# Patient Record
Sex: FEMALE | Race: WHITE | NOT HISPANIC OR LATINO | Employment: FULL TIME | URBAN - METROPOLITAN AREA
[De-identification: names, ages, dates, MRNs, and addresses within clinical notes are randomized per-mention and may not be internally consistent; named-entity substitution may affect disease eponyms.]

---

## 2017-07-18 ENCOUNTER — HOSPITAL ENCOUNTER (EMERGENCY)
Facility: MEDICAL CENTER | Age: 62
End: 2017-07-18
Attending: EMERGENCY MEDICINE
Payer: COMMERCIAL

## 2017-07-18 VITALS
DIASTOLIC BLOOD PRESSURE: 80 MMHG | HEART RATE: 74 BPM | OXYGEN SATURATION: 99 % | SYSTOLIC BLOOD PRESSURE: 135 MMHG | WEIGHT: 227.51 LBS | BODY MASS INDEX: 41.87 KG/M2 | RESPIRATION RATE: 16 BRPM | HEIGHT: 62 IN | TEMPERATURE: 99 F

## 2017-07-18 DIAGNOSIS — K64.9 HEMORRHOIDS, UNSPECIFIED HEMORRHOID TYPE: ICD-10-CM

## 2017-07-18 PROCEDURE — 99283 EMERGENCY DEPT VISIT LOW MDM: CPT

## 2017-07-18 ASSESSMENT — PAIN SCALES - GENERAL: PAINLEVEL_OUTOF10: 0

## 2017-07-18 NOTE — ED AVS SNAPSHOT
7/18/2017    Radha Oneill  [address] [place] [address]    Dear Radha:    [facility] wants to ensure your discharge home is safe and you or your loved ones have had all of your questions answered regarding your care after you leave the hospital.    Below is a list of resources and contact information should you have any questions regarding your hospital stay, follow-up instructions, or active medical symptoms.    Questions or Concerns Regarding… Contact   Medical Questions Related to Your Discharge  (7 days a week, 8am-5pm) Contact a Nurse Care Coordinator   [phone]   Medical Questions Not Related to Your Discharge  (24 hours a day / 7 days a week)  Contact the Nurse Health Line   [phone]    Medications or Discharge Instructions Refer to your discharge packet   or contact your [facility] Primary Care Provider   [phone]   Follow-up Appointment(s) Schedule your appointment via [employer]   or contact Scheduling [phone]   Billing Review your statement via [employer]  or contact Billing [phone]   Medical Records Review your records via N[employer]   or contact Medical Records [phone]     You may receive a telephone call within two days of discharge. This call is to make certain you understand your discharge instructions and have the opportunity to have any questions answered. You can also easily access your medical information, test results and upcoming appointments via the Nativoo free online health management tool. You can learn more and sign up at [employer]/Nativoo. For assistance setting up your Nativoo account, please call [phone].    Once again, we want to ensure your discharge home is safe and that you have a clear understanding of any next steps in your care. If you have any questions or concerns, please do not hesitate to contact us, we are here for you. Thank you for choosing [facility] for your healthcare needs.    Sincerely,    Your [facility] Healthcare Team

## 2017-07-18 NOTE — ED PROVIDER NOTES
"ED Provider Note    CHIEF COMPLAINT  Chief Complaint   Patient presents with   • Rectal Bleeding       HPI  Radha Oneill is a 61 y.o. female who presents with bright red blood per rectum. Has a history of hemorrhoids. Has had bleeding hemorrhoids in the past. She had diarrhea for 2 days last week. Bloody watery stool. After that got a little constipated. Over the weekend she started having bright red blood per rectum. She described this as initially being some streaks on tissue paper. She however had blood when she was sitting on a chair. Then she went to the restroom and also saw some clots in the toilet bowl. He has a history of chronic anemia related to her gastric bypass and chronic iron deficiency. She had a blood draw on Friday and is scheduled to see her doctor this week regarding this. She has not had any abdominal pain, nausea or vomiting. Denies dizziness or lightheadedness. No weakness. No fevers.    REVIEW OF SYSTEMS  Pertinent negative: As per HPI    PAST MEDICAL HISTORY  Past Medical History   Diagnosis Date   • Anemia    • GERD (gastroesophageal reflux disease)    • Hypertension    • Obesity    • Herpes        SOCIAL HISTORY  Social History   Substance Use Topics   • Smoking status: Former Smoker     Types: Cigarettes     Quit date: 11/29/2013   • Smokeless tobacco: Never Used   • Alcohol Use: No       SURGICAL HISTORY  Past Surgical History   Procedure Laterality Date   • Abdominal exploration     • Cholecystectomy     • Appendectomy     • Abdominal hysterectomy total     • Gastric bypass laparoscopic         ALLERGIES  Allergies   Allergen Reactions   • Codeine    • Penicillins        PHYSICAL EXAM  VITAL SIGNS: /81 mmHg  Pulse 77  Temp(Src) 37.2 °C (99 °F)  Resp 16  Ht 1.575 m (5' 2.01\")  Wt 103.2 kg (227 lb 8.2 oz)  BMI 41.60 kg/m2  SpO2 100%   Constitutional: Awake and alert. Nontoxic. Pale-appearing  HENT:  Grossly normal  Eyes: Grossly normal  Neck: Normal range of " motion  Cardiovascular: Normal heart rate   Thorax & Lungs: No respiratory distress  Abdomen: Nontender  Rectal:  There is an external hemorrhoid over the left anus that is thrombosed and ruptured. Mild active bleeding. Digital rectal exam demonstrates palpable internal hemorrhoids. There is firm nonbloody stool within the rectum  Skin:  No pathologic rash.   Extremities: Well perfused  Psychiatric: Affect normal        COURSE & MEDICAL DECISION MAKING  The patient presents with bleeding external hemorrhoids. This will be treated symptomatically. Advised stool bulking, plenty of fluids, sitz bath. He is having a history of anemia which is being followed by her primary doctor. I will defer to Dr. Gonzales. Patient is to return to the ER if she has dizziness, lightheadedness, severe heavy bleeding or as concerned.    Patient referred to primary provider for blood pressure management    FINAL IMPRESSION  1. Bleeding external hemorrhoids      Disposition: home in good condition      This dictation was created using voice recognition software. The accuracy of the dictation is limited to the abilities of the software.  The nursing notes were reviewed and certain aspects of this information were incorporated into this note.      Electronically signed by: Socrates Fuentes, 7/18/2017 10:26 AM

## 2017-07-18 NOTE — DISCHARGE INSTRUCTIONS
Hemorrhoids  Hemorrhoids are swollen veins around the rectum or anus. There are two types of hemorrhoids:   · Internal hemorrhoids. These occur in the veins just inside the rectum. They may poke through to the outside and become irritated and painful.  · External hemorrhoids. These occur in the veins outside the anus and can be felt as a painful swelling or hard lump near the anus.  CAUSES  · Pregnancy.    · Obesity.    · Constipation or diarrhea.    · Straining to have a bowel movement.    · Sitting for long periods on the toilet.  · Heavy lifting or other activity that caused you to strain.  · Anal intercourse.  SYMPTOMS   · Pain.    · Anal itching or irritation.    · Rectal bleeding.    · Fecal leakage.    · Anal swelling.    · One or more lumps around the anus.    DIAGNOSIS   Your caregiver may be able to diagnose hemorrhoids by visual examination. Other examinations or tests that may be performed include:   · Examination of the rectal area with a gloved hand (digital rectal exam).    · Examination of anal canal using a small tube (scope).    · A blood test if you have lost a significant amount of blood.  · A test to look inside the colon (sigmoidoscopy or colonoscopy).  TREATMENT  Most hemorrhoids can be treated at home. However, if symptoms do not seem to be getting better or if you have a lot of rectal bleeding, your caregiver may perform a procedure to help make the hemorrhoids get smaller or remove them completely. Possible treatments include:   · Placing a rubber band at the base of the hemorrhoid to cut off the circulation (rubber band ligation).    · Injecting a chemical to shrink the hemorrhoid (sclerotherapy).    · Using a tool to burn the hemorrhoid (infrared light therapy).    · Surgically removing the hemorrhoid (hemorrhoidectomy).    · Stapling the hemorrhoid to block blood flow to the tissue (hemorrhoid stapling).    HOME CARE INSTRUCTIONS   · Eat foods with fiber, such as whole grains, beans,  nuts, fruits, and vegetables. Ask your doctor about taking products with added fiber in them (fiber supplements).  · Increase fluid intake. Drink enough water and fluids to keep your urine clear or pale yellow.    · Exercise regularly.    · Go to the bathroom when you have the urge to have a bowel movement. Do not wait.    · Avoid straining to have bowel movements.    · Keep the anal area dry and clean. Use wet toilet paper or moist towelettes after a bowel movement.    · Medicated creams and suppositories may be used or applied as directed.    · Only take over-the-counter or prescription medicines as directed by your caregiver.    · Take warm sitz baths for 15-20 minutes, 3-4 times a day to ease pain and discomfort.    · Place ice packs on the hemorrhoids if they are tender and swollen. Using ice packs between sitz baths may be helpful.    ¨ Put ice in a plastic bag.    ¨ Place a towel between your skin and the bag.    ¨ Leave the ice on for 15-20 minutes, 3-4 times a day.    · Do not use a donut-shaped pillow or sit on the toilet for long periods. This increases blood pooling and pain.    SEEK MEDICAL CARE IF:  · You have increasing pain and swelling that is not controlled by treatment or medicine.  · You have uncontrolled bleeding.  · You have difficulty or you are unable to have a bowel movement.  · You have pain or inflammation outside the area of the hemorrhoids.  MAKE SURE YOU:  · Understand these instructions.  · Will watch your condition.  · Will get help right away if you are not doing well or get worse.     This information is not intended to replace advice given to you by your health care provider. Make sure you discuss any questions you have with your health care provider.     Document Released: 12/15/2001 Document Revised: 12/04/2013 Document Reviewed: 10/22/2013  [employer] Interactive Patient Education ©2016 Elsevier Inc.

## 2017-07-18 NOTE — ED AVS SNAPSHOT
[employer] Access Code: [email or web address]  Expires: 8/17/2017 10:26 AM    Your email address is not on file at [employer].  Email Addresses are required for you to sign up for [employer], please contact [phone] to verify your personal information and to provide your email address prior to attempting to register for [employer].    Radha OSCAR Oneill  [address] [place] [address]    [employer]  A secure, online tool to manage your health information     [employer]’s [employer]® is a secure, online tool that connects you to your personalized health information from the privacy of your home -- day or night - making it very easy for you to manage your healthcare. Once the activation process is completed, you can even access your medical information using the [employer] cricket, which is available for free in the Apple Cricket store or Google Play store.     To learn more about [employer], visit [email or web address]    There are two levels of access available (as shown below):   My Chart Features  [facility] Primary Care Doctor [facility]  Specialists [facility]  Urgent  Care Non-[facility] Primary Care Doctor   Email your healthcare team securely and privately 24/7 X X X    Manage appointments: schedule your next appointment; view details of past/upcoming appointments X      Request prescription refills. X      View recent personal medical records, including lab and immunizations X X X X   View health record, including health history, allergies, medications X X X X   Read reports about your outpatient visits, procedures, consult and ER notes X X X X   See your discharge summary, which is a recap of your hospital and/or ER visit that includes your diagnosis, lab results, and care plan X X  X     How to register for [employer]:  Once your e-mail address has been verified, follow the following steps to sign up for [employer].     1. Go to  [email or web address]  2. Click on the Sign Up Now box, which takes you to the New Member Sign Up page. You will  need to provide the following information:  a. Enter your [employer] Access Code exactly as it appears at the top of this page. (You will not need to use this code after you’ve completed the sign-up process. If you do not sign up before the expiration date, you must request a new code.)   b. Enter your date of birth.   c. Enter your home email address.   d. Click Submit, and follow the next screen’s instructions.  3. Create a [employer] ID. This will be your [employer] login ID and cannot be changed, so think of one that is secure and easy to remember.  4. Create a [employer] password. You can change your password at any time.  5. Enter your Password Reset Question and Answer. This can be used at a later time if you forget your password.   6. Enter your e-mail address. This allows you to receive e-mail notifications when new information is available in [employer].  7. Click Sign Up. You can now view your health information.    For assistance activating your [employer] account, call [phone]

## 2017-07-18 NOTE — ED AVS SNAPSHOT
Home Care Instructions                                                                                                                Radha Oneill   MRN: [id number]    Department:  [facility], Emergency Dept   Date of Visit:  7/18/2017            [facility], Emergency Dept    [address]    [place] [address]    Phone:  [phone]      You were seen by     Socrates Fuentes M.D.      Your Diagnosis Was     Hemorrhoids, unspecified hemorrhoid type     K64.9       Follow-up Information     1. Please follow up.    Why:  See your doctor as planned      Medication Information     Review all of your home medications and newly ordered medications with your primary doctor and/or pharmacist as soon as possible. Follow medication instructions as directed by your doctor and/or pharmacist.     Please keep your complete medication list with you and share with your physician. Update the information when medications are discontinued, doses are changed, or new medications (including over-the-counter products) are added; and carry medication information at all times in the event of emergency situations.               Medication List      ASK your doctor about these medications        Instructions    Morning Afternoon Evening Bedtime    hydrocodone-acetaminophen 5-500 MG Tabs   Commonly known as:  VICODIN        Take 0.5 Tabs by mouth as needed.   Dose:  0.5 Tab                        lansoprazole 15 MG Cpdr   Commonly known as:  PREVACID        Take 1 Cap by mouth every day. Take one tab in the morning 30 minutes before breakfast and one at HS x one week then one tablet daily in AM if symptoms improve.   Dose:  15 mg                        meloxicam 7.5 MG Tabs   Commonly known as:  MOBIC        Take 7.5 mg by mouth every day.   Dose:  7.5 mg                                  Discharge Instructions       Hemorrhoids  Hemorrhoids are swollen veins around the rectum or anus. There are two  types of hemorrhoids:   · Internal hemorrhoids. These occur in the veins just inside the rectum. They may poke through to the outside and become irritated and painful.  · External hemorrhoids. These occur in the veins outside the anus and can be felt as a painful swelling or hard lump near the anus.  CAUSES  · Pregnancy.    · Obesity.    · Constipation or diarrhea.    · Straining to have a bowel movement.    · Sitting for long periods on the toilet.  · Heavy lifting or other activity that caused you to strain.  · Anal intercourse.  SYMPTOMS   · Pain.    · Anal itching or irritation.    · Rectal bleeding.    · Fecal leakage.    · Anal swelling.    · One or more lumps around the anus.    DIAGNOSIS   Your caregiver may be able to diagnose hemorrhoids by visual examination. Other examinations or tests that may be performed include:   · Examination of the rectal area with a gloved hand (digital rectal exam).    · Examination of anal canal using a small tube (scope).    · A blood test if you have lost a significant amount of blood.  · A test to look inside the colon (sigmoidoscopy or colonoscopy).  TREATMENT  Most hemorrhoids can be treated at home. However, if symptoms do not seem to be getting better or if you have a lot of rectal bleeding, your caregiver may perform a procedure to help make the hemorrhoids get smaller or remove them completely. Possible treatments include:   · Placing a rubber band at the base of the hemorrhoid to cut off the circulation (rubber band ligation).    · Injecting a chemical to shrink the hemorrhoid (sclerotherapy).    · Using a tool to burn the hemorrhoid (infrared light therapy).    · Surgically removing the hemorrhoid (hemorrhoidectomy).    · Stapling the hemorrhoid to block blood flow to the tissue (hemorrhoid stapling).    HOME CARE INSTRUCTIONS   · Eat foods with fiber, such as whole grains, beans, nuts, fruits, and vegetables. Ask your doctor about taking products with added fiber in  them (fiber supplements).  · Increase fluid intake. Drink enough water and fluids to keep your urine clear or pale yellow.    · Exercise regularly.    · Go to the bathroom when you have the urge to have a bowel movement. Do not wait.    · Avoid straining to have bowel movements.    · Keep the anal area dry and clean. Use wet toilet paper or moist towelettes after a bowel movement.    · Medicated creams and suppositories may be used or applied as directed.    · Only take over-the-counter or prescription medicines as directed by your caregiver.    · Take warm sitz baths for 15-20 minutes, 3-4 times a day to ease pain and discomfort.    · Place ice packs on the hemorrhoids if they are tender and swollen. Using ice packs between sitz baths may be helpful.    ¨ Put ice in a plastic bag.    ¨ Place a towel between your skin and the bag.    ¨ Leave the ice on for 15-20 minutes, 3-4 times a day.    · Do not use a donut-shaped pillow or sit on the toilet for long periods. This increases blood pooling and pain.    SEEK MEDICAL CARE IF:  · You have increasing pain and swelling that is not controlled by treatment or medicine.  · You have uncontrolled bleeding.  · You have difficulty or you are unable to have a bowel movement.  · You have pain or inflammation outside the area of the hemorrhoids.  MAKE SURE YOU:  · Understand these instructions.  · Will watch your condition.  · Will get help right away if you are not doing well or get worse.     This information is not intended to replace advice given to you by your health care provider. Make sure you discuss any questions you have with your health care provider.     Document Released: 12/15/2001 Document Revised: 12/04/2013 Document Reviewed: 10/22/2013  [employer] Interactive Patient Education ©2016 [employer]            Patient Information     Patient Information    Following emergency treatment: all patient requiring follow-up care must return either to a private physician  or a clinic if your condition worsens before you are able to obtain further medical attention, please return to the emergency room.     Billing Information    At [facility], we work to make the billing process streamlined for our patients.  Our Representatives are here to answer any questions you may have regarding your hospital bill.  If you have insurance coverage and have supplied your insurance information to us, we will submit a claim to your insurer on your behalf.  Should you have any questions regarding your bill, we can be reached online or by phone as follows:  Online: You are able pay your bills online or live chat with our representatives about any billing questions you may have. We are here to help Monday - Friday from 8:00am to 7:30pm and 9:00am - 12:00pm on Saturdays.  Please visit [email or web address]  for more information.   Phone:  [phone] or [phone]    Please note that your emergency physician, surgeon, pathologist, radiologist, anesthesiologist, and other specialists are not employed by [facility] and will therefore bill separately for their services.  Please contact them directly for any questions concerning their bills at the numbers below:     Emergency Physician Services:  [phone]  [facility]:  [phone]  [facility]:  [phone]  [facility]:  [phone]    1. Your final bill may vary from the amount quoted upon discharge if all procedures are not complete at that time, or if your doctor has additional procedures of which we are not aware. You will receive an additional bill if you return to the Emergency Department at [facility] for suture removal regardless of the facility of which the sutures were placed.     2. Please arrange for settlement of this account at the emergency registration.    3. All self-pay accounts are due in full at the time of treatment.  If you are unable to  meet this obligation then payment is expected within 4-5 days.     4. If you have had radiology studies (CT, X-ray, Ultrasound, MRI), you have received a preliminary result during your emergency department visit. Please contact the radiology department [phone] to receive a copy of your final result. Please discuss the Final result with your primary physician or with the follow up physician provided.     Crisis Hotline:  [place] Crisis Hotline:  7-898-ZMXNLUG or 1-955.189.2335  [place] Crisis Hotline:    1-587.819.1852 or 544-463-3945         ED Discharge Follow Up Questions    1. In order to provide you with very good care, we would like to follow up with a phone call in the next few days.  May we have your permission to contact you?     YES /  NO    2. What is the best phone number to call you? (       )_____-__________    3. What is the best time to call you?      Morning  /  Afternoon  /  Evening                   Patient Signature:  ____________________________________________________________    Date:  ____________________________________________________________

## 2017-07-18 NOTE — ED NOTES
Patient verbalizes understanding of discharge instructions. Patient to follow up with PCP for further treatment. Patient ambulatory to discharge with steady gait. NAD or deficits noted at time of discharge

## 2017-07-18 NOTE — ED NOTES
Pt reports acute onset of bright red rectal bleeding. Reports she noted large clots. Pt denies any abdominal pain. Pt in NAD.

## 2018-03-05 ENCOUNTER — OFFICE VISIT (OUTPATIENT)
Dept: URGENT CARE | Facility: PHYSICIAN GROUP | Age: 63
End: 2018-03-05
Payer: COMMERCIAL

## 2018-03-05 VITALS
OXYGEN SATURATION: 97 % | HEART RATE: 78 BPM | WEIGHT: 227 LBS | TEMPERATURE: 98.4 F | SYSTOLIC BLOOD PRESSURE: 114 MMHG | DIASTOLIC BLOOD PRESSURE: 74 MMHG | BODY MASS INDEX: 41.77 KG/M2 | HEIGHT: 62 IN

## 2018-03-05 DIAGNOSIS — J34.89 NASAL CONGESTION WITH RHINORRHEA: ICD-10-CM

## 2018-03-05 DIAGNOSIS — R09.81 NASAL CONGESTION WITH RHINORRHEA: ICD-10-CM

## 2018-03-05 DIAGNOSIS — H61.21 CERUMEN DEBRIS ON TYMPANIC MEMBRANE OF RIGHT EAR: ICD-10-CM

## 2018-03-05 DIAGNOSIS — R05.9 COUGH: ICD-10-CM

## 2018-03-05 DIAGNOSIS — H92.01 RIGHT EAR PAIN: ICD-10-CM

## 2018-03-05 DIAGNOSIS — J01.40 ACUTE PANSINUSITIS, RECURRENCE NOT SPECIFIED: ICD-10-CM

## 2018-03-05 PROCEDURE — 99204 OFFICE O/P NEW MOD 45 MIN: CPT | Mod: 25 | Performed by: NURSE PRACTITIONER

## 2018-03-05 PROCEDURE — 69210 REMOVE IMPACTED EAR WAX UNI: CPT | Performed by: NURSE PRACTITIONER

## 2018-03-05 RX ORDER — VALACYCLOVIR HYDROCHLORIDE 500 MG/1
TABLET, FILM COATED ORAL
COMMUNITY
Start: 2018-02-07

## 2018-03-05 RX ORDER — LISINOPRIL 20 MG/1
TABLET ORAL
COMMUNITY
Start: 2018-02-07

## 2018-03-05 RX ORDER — DOXYCYCLINE HYCLATE 100 MG
100 TABLET ORAL 2 TIMES DAILY
Qty: 14 TAB | Refills: 0 | Status: SHIPPED | OUTPATIENT
Start: 2018-03-05

## 2018-03-05 RX ORDER — OMEPRAZOLE 20 MG/1
CAPSULE, DELAYED RELEASE ORAL
COMMUNITY
Start: 2018-02-07

## 2018-03-05 ASSESSMENT — ENCOUNTER SYMPTOMS
CHILLS: 0
SHORTNESS OF BREATH: 0
SINUS PAIN: 1
DIZZINESS: 0
ABDOMINAL PAIN: 0
PALPITATIONS: 0
COUGH: 1
WHEEZING: 0
VOMITING: 0
FEVER: 0
NECK PAIN: 0
MYALGIAS: 0
ORTHOPNEA: 0
EYE DISCHARGE: 0
SORE THROAT: 0
CONSTIPATION: 0
SPUTUM PRODUCTION: 0
WEAKNESS: 0
HEADACHES: 1
DIARRHEA: 0
NAUSEA: 0
EYE REDNESS: 0

## 2018-03-05 NOTE — PROGRESS NOTES
Subjective:      Radha Oneill is a 62 y.o. female who presents with Sinus Problem (Sinus pressure and pain, slight cough X 5 days )            HPI  Radha is here for sinus problems x 5 days. C/o facial/ear pressure, right ear pain, PND, sore throat, slight cough without SOB, chest tightness, wheeze. Sudafed, Mucinex.     PMH:  has a past medical history of Anemia; GERD (gastroesophageal reflux disease); Herpes; Hypertension; and Obesity.  MEDS:   Current Outpatient Prescriptions:   •  valACYclovir (VALTREX) 500 MG Tab, , Disp: , Rfl:   •  lisinopril (PRINIVIL) 20 MG Tab, , Disp: , Rfl:   •  omeprazole (PRILOSEC) 20 MG delayed-release capsule, , Disp: , Rfl:   •  doxycycline (VIBRAMYCIN) 100 MG Tab, Take 1 Tab by mouth 2 times a day., Disp: 14 Tab, Rfl: 0  •  meloxicam (MOBIC) 7.5 MG TABS, Take 7.5 mg by mouth every day., Disp: , Rfl:   •  hydrocodone-acetaminophen (VICODIN) 5-500 MG TABS, Take 0.5 Tabs by mouth as needed., Disp: , Rfl:   •  lansoprazole (PREVACID) 15 MG CPDR, Take 1 Cap by mouth every day. Take one tab in the morning 30 minutes before breakfast and one at HS x one week then one tablet daily in AM if symptoms improve., Disp: 60 Each, Rfl: 3  ALLERGIES:   Allergies   Allergen Reactions   • Codeine    • Penicillins      SURGHX:   Past Surgical History:   Procedure Laterality Date   • ABDOMINAL EXPLORATION     • ABDOMINAL HYSTERECTOMY TOTAL     • APPENDECTOMY     • CHOLECYSTECTOMY     • GASTRIC BYPASS LAPAROSCOPIC       SOCHX:  reports that she quit smoking about 4 years ago. Her smoking use included Cigarettes. She has never used smokeless tobacco. She reports that she does not drink alcohol or use drugs.  FH: Family history was reviewed, no pertinent findings to report    Review of Systems   Constitutional: Positive for malaise/fatigue. Negative for chills and fever.   HENT: Positive for congestion, ear pain and sinus pain. Negative for sore throat.    Eyes: Negative for discharge and redness.  "  Respiratory: Positive for cough. Negative for sputum production, shortness of breath and wheezing.    Cardiovascular: Negative for chest pain, palpitations and orthopnea.   Gastrointestinal: Negative for abdominal pain, constipation, diarrhea, nausea and vomiting.   Musculoskeletal: Negative for myalgias and neck pain.   Neurological: Positive for headaches. Negative for dizziness and weakness.   All other systems reviewed and are negative.         Objective:     /74   Pulse 78   Temp 36.9 °C (98.4 °F)   Ht 1.575 m (5' 2.01\")   Wt 103 kg (227 lb)   SpO2 97%   BMI 41.51 kg/m²      Physical Exam   Constitutional: She is oriented to person, place, and time. Vital signs are normal. She appears well-developed and well-nourished. She is active and cooperative.  Non-toxic appearance. She does not have a sickly appearance. She appears ill. No distress.   HENT:   Head: Normocephalic.   Right Ear: External ear and ear canal normal. Tympanic membrane is injected.   Left Ear: External ear and ear canal normal. Tympanic membrane is injected.   Mouth/Throat: Uvula is midline. Mucous membranes are dry. No uvula swelling. Posterior oropharyngeal edema and posterior oropharyngeal erythema present.   Ears: Gaby pinnae. Right external auditory canal with occlusion with impacted cerumen. Procedure: Pt then prepped and lavaged by MA and residual wax curetted by Provider with resolution of impaction.   TM pearly gray with normal light reflex bilaterally.       Eyes: Conjunctivae and EOM are normal. Pupils are equal, round, and reactive to light.   Neck: Normal range of motion. Neck supple.   Cardiovascular: Normal rate and regular rhythm.    Pulmonary/Chest: Effort normal and breath sounds normal. No accessory muscle usage. No respiratory distress. She has no decreased breath sounds. She has no wheezes. She has no rhonchi. She has no rales.   Musculoskeletal: Normal range of motion.   Neurological: She is alert and " oriented to person, place, and time.   Skin: Skin is warm and dry. She is not diaphoretic.   Vitals reviewed.              Assessment/Plan:     1. Nasal congestion with rhinorrhea    2. Cough    3. Right ear pain    4. Cerumen debris on tympanic membrane of right ear    - REMOVAL IMPACTED EAR WAX    5. Acute pansinusitis, recurrence not specified    - doxycycline (VIBRAMYCIN) 100 MG Tab; Take 1 Tab by mouth 2 times a day.  Dispense: 14 Tab; Refill: 0    D/c Sudafed and Mucinex sinus  Increase water intake  Get rest  May use Ibuprofen/Tylenol prn for any fever, body aches or throat pain  May use saline nasal spray for nasal congestion  May use Flonase or Nasocort for allergen nasal congestion  May gargle with salt water prn for throat discomfort  May drink smoothies for nutrition if too painful to swallow solid foods  Monitor for productive cough, SOB and chest pain/tightness- need re-evaluation